# Patient Record
Sex: MALE | Race: WHITE | NOT HISPANIC OR LATINO
[De-identification: names, ages, dates, MRNs, and addresses within clinical notes are randomized per-mention and may not be internally consistent; named-entity substitution may affect disease eponyms.]

---

## 2022-07-13 ENCOUNTER — TRANSCRIPTION ENCOUNTER (OUTPATIENT)
Age: 72
End: 2022-07-13

## 2022-07-15 ENCOUNTER — OUTPATIENT (OUTPATIENT)
Dept: INPATIENT UNIT | Facility: HOSPITAL | Age: 72
LOS: 1 days | Discharge: HOME | End: 2022-07-15

## 2022-07-15 ENCOUNTER — APPOINTMENT (OUTPATIENT)
Age: 72
End: 2022-07-15

## 2022-07-15 VITALS
DIASTOLIC BLOOD PRESSURE: 88 MMHG | RESPIRATION RATE: 17 BRPM | HEART RATE: 84 BPM | TEMPERATURE: 98 F | SYSTOLIC BLOOD PRESSURE: 162 MMHG | OXYGEN SATURATION: 97 %

## 2022-07-15 VITALS
SYSTOLIC BLOOD PRESSURE: 158 MMHG | OXYGEN SATURATION: 98 % | RESPIRATION RATE: 17 BRPM | TEMPERATURE: 98 F | DIASTOLIC BLOOD PRESSURE: 88 MMHG | HEART RATE: 80 BPM

## 2022-07-15 DIAGNOSIS — U07.1 COVID-19: ICD-10-CM

## 2022-07-15 RX ORDER — BEBTELOVIMAB 87.5 MG/ML
175 INJECTION, SOLUTION INTRAVENOUS ONCE
Refills: 0 | Status: COMPLETED | OUTPATIENT
Start: 2022-07-15 | End: 2022-07-15

## 2022-07-15 RX ADMIN — BEBTELOVIMAB 175 MILLIGRAM(S): 87.5 INJECTION, SOLUTION INTRAVENOUS at 12:15

## 2022-07-15 NOTE — CHART NOTE - NSCHARTNOTEFT_GEN_A_CORE
PLAN:  - infusion procedure explained to patient   -Consent for monoclonal antibody infusion obtained   - Risk & benifits discussed/all questions answered  -infuse  Bamlanivimab 700mg  IV over 15 secs  -observe patient for one hour post infusion      Patient tolerated infusion well denies complaints of chest pain/SOB/dizziness/ palpitations  Vital signs hemodynamically stable for discharge home  Discharge instructions given/ fact sheet included.  Patient to follow-up with PCP as needed.  Patient tolerated infusion well denies complaints of chest pain/SOB/dizziness/ palpitations  Vital signs hemodynamically stable for discharge home  Discharge instructions given/ fact sheet included.  Patient to follow-up with PCP as needed. PLAN:  - infusion procedure explained to patient   -Consent for monoclonal antibody infusion obtained   - Risk & benifits discussed/all questions answered  -infuse  Bamlanivimab 175mg  IV over 15 secs  -observe patient for one hour post infusion      Patient tolerated infusion well denies complaints of chest pain/SOB/dizziness/ palpitations  Vital signs hemodynamically stable for discharge home  Discharge instructions given/ fact sheet included.  Patient to follow-up with PCP as needed.  Patient tolerated infusion well denies complaints of chest pain/SOB/dizziness/ palpitations  Vital signs hemodynamically stable for discharge home  Discharge instructions given/ fact sheet included.  Patient to follow-up with PCP as needed. PLAN:  - infusion procedure explained to patient   -Consent for monoclonal antibody infusion obtained   - Risk & benifits discussed/all questions answered  --infuse  Bamlanivimab 175mg  IV over 15 secs  -observe patient for one hour post infusion      Patient tolerated infusion well denies complaints of chest pain/SOB/dizziness/ palpitations  Vital signs hemodynamically stable for discharge home  Discharge instructions given/ fact sheet included.  Patient to follow-up with PCP as needed.  Instructed patient to contact the call center or their PMD if they develop side effects at home.  Instructed the patient to call 911 and go to the emergency room if they develop symptoms of a severe allergic reaction. Patient verbalized understanding.

## 2022-07-15 NOTE — CHART NOTE - PRIOR COVID TREATMENT
CC: Monoclonal Antibody Infusion/COVID 19 Positive    72yMale sent into outpatient infusion clinic for Monoclonal Antibody Infusion with Bamlanivimab. Patient with onset of symptoms 7/12/22  Tested positive for COVID-19 on 7/13/22          Risk Profile includes:       age        pt reports NKDA CC: Monoclonal Antibody Infusion/COVID 19 Positive    72yMale sent into outpatient infusion clinic for Monoclonal Antibody Infusion with Bamlanivimab. Patient with onset of symptoms 7/12/22  Tested positive for COVID-19 on 7/13/22          Risk Profile includes:   kidney disease  HTN  age        pt reports NKDA